# Patient Record
Sex: FEMALE | Race: WHITE
[De-identification: names, ages, dates, MRNs, and addresses within clinical notes are randomized per-mention and may not be internally consistent; named-entity substitution may affect disease eponyms.]

---

## 2021-03-06 ENCOUNTER — HOSPITAL ENCOUNTER (INPATIENT)
Dept: HOSPITAL 54 - ER | Age: 86
LOS: 1 days | Discharge: HOME | DRG: 282 | End: 2021-03-07
Attending: NURSE PRACTITIONER | Admitting: NURSE PRACTITIONER
Payer: COMMERCIAL

## 2021-03-06 VITALS — SYSTOLIC BLOOD PRESSURE: 137 MMHG | DIASTOLIC BLOOD PRESSURE: 69 MMHG

## 2021-03-06 VITALS — BODY MASS INDEX: 31.53 KG/M2 | WEIGHT: 167 LBS | HEIGHT: 61 IN

## 2021-03-06 VITALS — SYSTOLIC BLOOD PRESSURE: 147 MMHG | DIASTOLIC BLOOD PRESSURE: 79 MMHG

## 2021-03-06 VITALS — SYSTOLIC BLOOD PRESSURE: 161 MMHG | DIASTOLIC BLOOD PRESSURE: 83 MMHG

## 2021-03-06 VITALS — DIASTOLIC BLOOD PRESSURE: 76 MMHG | SYSTOLIC BLOOD PRESSURE: 125 MMHG

## 2021-03-06 DIAGNOSIS — E11.9: ICD-10-CM

## 2021-03-06 DIAGNOSIS — I25.2: ICD-10-CM

## 2021-03-06 DIAGNOSIS — I10: ICD-10-CM

## 2021-03-06 DIAGNOSIS — E78.5: ICD-10-CM

## 2021-03-06 DIAGNOSIS — Z95.1: ICD-10-CM

## 2021-03-06 DIAGNOSIS — Z95.0: ICD-10-CM

## 2021-03-06 DIAGNOSIS — E66.9: ICD-10-CM

## 2021-03-06 DIAGNOSIS — I25.10: ICD-10-CM

## 2021-03-06 DIAGNOSIS — I21.4: Primary | ICD-10-CM

## 2021-03-06 DIAGNOSIS — I16.0: ICD-10-CM

## 2021-03-06 DIAGNOSIS — Z20.822: ICD-10-CM

## 2021-03-06 LAB
ALBUMIN SERPL BCP-MCNC: 3.9 G/DL (ref 3.4–5)
ALP SERPL-CCNC: 122 U/L (ref 46–116)
ALT SERPL W P-5'-P-CCNC: 15 U/L (ref 12–78)
AST SERPL W P-5'-P-CCNC: 16 U/L (ref 15–37)
BASOPHILS # BLD AUTO: 0.3 /CMM (ref 0–0.2)
BASOPHILS NFR BLD AUTO: 2.8 % (ref 0–2)
BILIRUB DIRECT SERPL-MCNC: 0.1 MG/DL (ref 0–0.2)
BILIRUB SERPL-MCNC: 0.5 MG/DL (ref 0.2–1)
BUN SERPL-MCNC: 20 MG/DL (ref 7–18)
CALCIUM SERPL-MCNC: 9.2 MG/DL (ref 8.5–10.1)
CHLORIDE SERPL-SCNC: 100 MMOL/L (ref 98–107)
CO2 SERPL-SCNC: 28 MMOL/L (ref 21–32)
CREAT SERPL-MCNC: 0.9 MG/DL (ref 0.6–1.3)
EOSINOPHIL NFR BLD AUTO: 1.6 % (ref 0–6)
GLUCOSE SERPL-MCNC: 151 MG/DL (ref 74–106)
HCT VFR BLD AUTO: 43 % (ref 33–45)
HGB BLD-MCNC: 14.3 G/DL (ref 11.5–14.8)
LYMPHOCYTES NFR BLD AUTO: 1.4 /CMM (ref 0.8–4.8)
LYMPHOCYTES NFR BLD AUTO: 15.4 % (ref 20–44)
MCHC RBC AUTO-ENTMCNC: 34 G/DL (ref 31–36)
MCV RBC AUTO: 82 FL (ref 82–100)
MONOCYTES NFR BLD AUTO: 0.6 /CMM (ref 0.1–1.3)
MONOCYTES NFR BLD AUTO: 6.4 % (ref 2–12)
NEUTROPHILS # BLD AUTO: 6.8 /CMM (ref 1.8–8.9)
NEUTROPHILS NFR BLD AUTO: 73.8 % (ref 43–81)
NT-PROBNP SERPL-MCNC: 473 PG/ML (ref 0–125)
PLATELET # BLD AUTO: 204 /CMM (ref 150–450)
POTASSIUM SERPL-SCNC: 3.6 MMOL/L (ref 3.5–5.1)
PROT SERPL-MCNC: 8.1 G/DL (ref 6.4–8.2)
RBC # BLD AUTO: 5.18 MIL/UL (ref 4–5.2)
SODIUM SERPL-SCNC: 138 MMOL/L (ref 136–145)
WBC NRBC COR # BLD AUTO: 9.2 K/UL (ref 4.3–11)

## 2021-03-06 PROCEDURE — C9803 HOPD COVID-19 SPEC COLLECT: HCPCS

## 2021-03-06 PROCEDURE — G0378 HOSPITAL OBSERVATION PER HR: HCPCS

## 2021-03-06 RX ADMIN — METOPROLOL TARTRATE SCH MG: 50 TABLET, FILM COATED ORAL at 23:13

## 2021-03-06 RX ADMIN — METOPROLOL TARTRATE SCH MG: 50 TABLET, FILM COATED ORAL at 17:52

## 2021-03-06 RX ADMIN — LOSARTAN POTASSIUM AND HYDROCHLOROTHIAZIDE SCH EA: 50; 12.5 TABLET, FILM COATED ORAL at 09:13

## 2021-03-06 RX ADMIN — METOPROLOL TARTRATE SCH MG: 50 TABLET, FILM COATED ORAL at 12:13

## 2021-03-06 RX ADMIN — ENOXAPARIN SODIUM SCH MG: 80 INJECTION SUBCUTANEOUS at 20:07

## 2021-03-06 RX ADMIN — ASPIRIN 81 MG SCH MG: 81 TABLET ORAL at 09:13

## 2021-03-06 RX ADMIN — ENOXAPARIN SODIUM SCH MG: 80 INJECTION SUBCUTANEOUS at 20:10

## 2021-03-06 NOTE — NUR
TELE RN OPENING NOTES

PATIENT RESTING IN BED A/OX4; Macedonian SPEAKING; ABLE TO MAKE NEEDS KNOWN. ON ROOM AIR AND 
TOLERATING WELL WITH NO SOB. EXTERNAL CARDIAC MONITOR READS NSR AND HR AT 70'S. IV LAC #18 
S/L; PATENT AND INTACT. PATIENT DENIES PAIN AT THIS TIME. ALL SAFETY MEASURES IN PLACE: BED 
IN LOWEST LOCKED POSITION, CALL LIGHT WITHIN EASY REACH, BED ALARMS ON, SIDERAILS UPX2. 
PATIENT MEDICALLY STABLE AT THIS TIME.

## 2021-03-06 NOTE — NUR
TELE/RN OPENING NOTE



RECEIVED PATIENT RESTING IN BED. ALERT AND ORIENTED X 3. PRIMARILY Yakut SPEAKING. NO 
COMPLAINTS OF PAIN AT THIS TIME. CONTINUES ON ROOM AIR WITH NO SIGNS OR SYMPTOMS OF 
RESPIRATORY DISTRESS NOTED. IV ACCESS TO LEFT AC #18 INTACT AND PATENT. NO COMPLAINTS OF SOB 
OR CHEST PAIN NOTED. CALL LIGHT WITHIN REACH. ASPIRATION, FALL AND SAFETY PRECAUTIONS 
MAINTAINED. WILL CONTINUE TO MONITOR.

## 2021-03-06 NOTE — NUR
PT BIBRA FROM HOME C/O LEFT SIDE CHEST PAIN RADIATING TO L UPPER EXT AND L JAW 
PTA. NOTED HYPERTENSION, MD AWARE. PT ALSO C/O WEAKNESS. PT AWAKE, Ivorian 
SPEAKING. RESPIRATIONS EVEN AND UNLABORED. NO ACUTE DISTRESS NOTED AT THIS 
TIME. PLACED IN GOWN AND ON CONTINUOUS CARDIAC MONITOR AND PULSE OX, WILL 
CONTINUE TO MONITOR

## 2021-03-06 NOTE — NUR
TELE RN CLOSING NOTES



PATIENT RESTING IN BED, AWAKE AND VERBALLY RESPONSIVE. A/OX4, Telugu SPEAKING, ABLE TO MAKE 
NEEDS KNOWN. BREATHING EVEN AND UNLABORED, CONTINUES ON ROOM AIR, NO SOB NOTED. ON 
TELE-MONITORING READING OF SR, V-PACING, AT MID 70'S. IV LINE ON LAC #18 PATENT AND INTACT. 
ABLE TO AMBULATE TO BATHROOM W/ ASSIST. SAFETY MEASURES MAINTAINED. WILL ENDORSE TO NIGHT 
SHIFT RN FOR EDWINA.

## 2021-03-06 NOTE — NUR
CTA RN NOTES

PT STABLE S/P CTA , NO REACTION NOTED , VSS , DENIES SOB ,CHEST PAIN AND DISCOMFORT , PIV 
PATENT AND INTACT V PACING 70 ON THE MONITOR  , AFEBRILE , WILL CONTINUE TO MONITOR .

## 2021-03-06 NOTE — NUR
RN NOTES



PATIENT SEEN BY DR. FARAH W/ ORDER FOR CT ANGIO PROCEDURE; Kenyan-SPEAKING STAFF UTILIZED 
FOR TRANSLATION. CONSENT FORMS SIGNED BY PATIENT. PER RADIOLOGY, WILL TAKE PATIENT AROUND 
NOON/AFTER LUNCH.

## 2021-03-06 NOTE — NUR
TELE RN CLOSING NOTES



PATIENT RESTING IN BED A/OX4; Swedish SPEAKING; ABLE TO MAKE NEEDS KNOWN. ON ROOM AIR AND 
TOLERATING WELL WITH NO SOB. EXTERNAL CARDIAC MONITOR READS NSR AND HR AT 70'S. IV LAC #18 
S/L; PATENT AND INTACT. PATIENT DENIES PAIN AT THIS TIME. ALL SAFETY MEASURES IN PLACE: BED 
IN LOWEST LOCKED POSITION, CALL LIGHT WITHIN EASY REACH, BED ALARMS ON, SIDERAILS UPX2. 
PATIENT MEDICALLY STABLE AT THIS TIME. RECONCILED HOME MEDICATIONS WITH PATIENT'S DAUGHTER. 
WILL ENDORSE EDWINA TO ONCOMING MORNING RN.

## 2021-03-06 NOTE — NUR
TELE/RN NOTE



PATIENT RECEIVED DOSE OF ENOXAPARIN @ 2000. MD ORDER IN PLACE IS FOR q12 ENOXAPARIN.

## 2021-03-06 NOTE — NUR
RN NOTES



PATIENT RETURNED FROM CT ANGIO PROCEDURE VIA WHEELCHAIR, ACCOMPANIED BY 2 RADIOLOGY TECHS. 
PATIENT SITTING UP AT EDGE OF BED EATING LATE LUNCH. INFORMED BY RADIOLOGY TECH TO HOLD 
METFORMIN X48 HRS IF PATIENT IS ON METFORMIN.

## 2021-03-06 NOTE — NUR
RN NOTES



PATIENT WAS TAKEN FOR CT ANGIO PROCEDURE VIA WHEELCHAIR, ACCOMPANIED BY 2 RADIOLOGY TECHS.

## 2021-03-06 NOTE — NUR
TELE RN ADMISSION NOTES



RECEIVED REPORT FROM SARA NICE. PATIENT TRANSFERRED FROM ER TO TELE; PATIENT ABLE TO 
AMBULATE FROM [facility] TO BED. PATIENT A/OX4; Lithuanian SPEAKING; ABLE TO MAKE NEEDS KNOWN. ON 
ROOM AIR AND TOLERATING WELL WITH NO SOB. EXTERNAL CARDIAC MONITOR READS NSR AND HR AT 70'S. 
IV LAC #18 S/L; PATENT AND INTACT. PATIENT DENIES PAIN AT THIS TIME. ALL BELONGINGS 
ACCOUNTED FOR AND RN SIGNED PATIENT BELONGINGS CHECKLIST. RN ORIENTED PATIENT TO ROOM, UNIT, 
AND STAFF. ALL SAFETY MEASURES IN PLACE: BED IN LOWEST LOCKED POSITION, CALL LIGHT WITHIN 
EASY REACH, BED ALARMS ON, SIDERAILS UPX2. PATIENT MEDICALLY STABLE AT THIS TIME.

## 2021-03-07 VITALS — DIASTOLIC BLOOD PRESSURE: 77 MMHG | SYSTOLIC BLOOD PRESSURE: 116 MMHG

## 2021-03-07 VITALS — SYSTOLIC BLOOD PRESSURE: 143 MMHG | DIASTOLIC BLOOD PRESSURE: 74 MMHG

## 2021-03-07 VITALS — SYSTOLIC BLOOD PRESSURE: 135 MMHG | DIASTOLIC BLOOD PRESSURE: 68 MMHG

## 2021-03-07 VITALS — DIASTOLIC BLOOD PRESSURE: 72 MMHG | SYSTOLIC BLOOD PRESSURE: 175 MMHG

## 2021-03-07 LAB
BASOPHILS # BLD AUTO: 0 /CMM (ref 0–0.2)
BASOPHILS NFR BLD AUTO: 0.8 % (ref 0–2)
BUN SERPL-MCNC: 20 MG/DL (ref 7–18)
CALCIUM SERPL-MCNC: 9.3 MG/DL (ref 8.5–10.1)
CHLORIDE SERPL-SCNC: 103 MMOL/L (ref 98–107)
CHOLEST SERPL-MCNC: 137 MG/DL (ref ?–200)
CO2 SERPL-SCNC: 28 MMOL/L (ref 21–32)
CREAT SERPL-MCNC: 1 MG/DL (ref 0.6–1.3)
EOSINOPHIL NFR BLD AUTO: 2.2 % (ref 0–6)
GLUCOSE SERPL-MCNC: 137 MG/DL (ref 74–106)
HCT VFR BLD AUTO: 39 % (ref 33–45)
HDLC SERPL-MCNC: 44 MG/DL (ref 40–60)
HGB BLD-MCNC: 13.2 G/DL (ref 11.5–14.8)
LDLC SERPL DIRECT ASSAY-MCNC: 75 MG/DL (ref 0–99)
LYMPHOCYTES NFR BLD AUTO: 1.4 /CMM (ref 0.8–4.8)
LYMPHOCYTES NFR BLD AUTO: 25.3 % (ref 20–44)
MAGNESIUM SERPL-MCNC: 2.2 MG/DL (ref 1.8–2.4)
MCHC RBC AUTO-ENTMCNC: 34 G/DL (ref 31–36)
MCV RBC AUTO: 83 FL (ref 82–100)
MONOCYTES NFR BLD AUTO: 0.5 /CMM (ref 0.1–1.3)
MONOCYTES NFR BLD AUTO: 9.4 % (ref 2–12)
NEUTROPHILS # BLD AUTO: 3.5 /CMM (ref 1.8–8.9)
NEUTROPHILS NFR BLD AUTO: 62.3 % (ref 43–81)
NT-PROBNP SERPL-MCNC: 675 PG/ML (ref 0–125)
PHOSPHATE SERPL-MCNC: 3.5 MG/DL (ref 2.5–4.9)
PLATELET # BLD AUTO: 159 /CMM (ref 150–450)
POTASSIUM SERPL-SCNC: 3.8 MMOL/L (ref 3.5–5.1)
RBC # BLD AUTO: 4.74 MIL/UL (ref 4–5.2)
SODIUM SERPL-SCNC: 139 MMOL/L (ref 136–145)
TRIGL SERPL-MCNC: 106 MG/DL (ref 30–150)
TSH SERPL DL<=0.005 MIU/L-ACNC: 2.91 UIU/ML (ref 0.36–3.74)
WBC NRBC COR # BLD AUTO: 5.6 K/UL (ref 4.3–11)

## 2021-03-07 RX ADMIN — LOSARTAN POTASSIUM AND HYDROCHLOROTHIAZIDE SCH EA: 50; 12.5 TABLET, FILM COATED ORAL at 08:17

## 2021-03-07 RX ADMIN — ASPIRIN 81 MG SCH MG: 81 TABLET ORAL at 08:18

## 2021-03-07 RX ADMIN — METOPROLOL TARTRATE SCH MG: 50 TABLET, FILM COATED ORAL at 05:52

## 2021-03-07 RX ADMIN — ENOXAPARIN SODIUM SCH MG: 80 INJECTION SUBCUTANEOUS at 08:20

## 2021-03-07 RX ADMIN — METOPROLOL TARTRATE SCH MG: 50 TABLET, FILM COATED ORAL at 11:51

## 2021-03-07 NOTE — NUR
TELE RN OPENING NOTES



PATIENT RESTING IN BED, AWAKE AND VERBALLY RESPONSIVE. A/OX4, Greek SPEAKING, ABLE TO MAKE 
NEEDS KNOWN. BREATHING EVEN AND UNLABORED, ON ROOM AIR, NO RESPIRATORY DISTRESS. NO 
COMPLAINT OF PAIN AT THIS TIME. ON TELE-MONITORING READING OF SR, V-PACING, AT 70'S. IV LINE 
ON LAC #18 PATENT AND INTACT. SAFETY MEASURES IN PLACE. WILL CONTINUE TO MONITOR.

## 2021-03-07 NOTE — NUR
TELE/RN CLOSING NOTE



PATIENT CURRENTLY RESTING IN BED. ALERT AND ORIENTED X 3. Macedonian SPEAKING. NO COMPLAINTS OF 
PAIN THIS SHIFT. IV ACCESS TO LEFT AC #18G INTACT AND PATENT. EKG DONE THIS AM AND IN CHART. 
CALL LIGHT WITHIN REACH. ASPIRATION, FALL AND SAFETY PRECAUTIONS MAINTAINED. WILL ENDORSE 
PLAN OF CARE TO ONCOMING SHIFT.

## 2021-03-07 NOTE — NUR
RN DISCHARGE NOTES



PATIENT SEEN BY DR. GILBERT W/ ORDER FOR DISCHARGE TO HOME; CLEARED BY CARDIOLOGY. PATIENT IS 
A/O X3, VERBALLY RESPONSIVE, ABLE TO MAKE NEEDS KNOWN AND UNDERSTAND CONCEPTS. DISCHARGE 
INSTRUCTIONS AND EDUCATION PROVIDED TO PATIENT AND FAMILY VIA Jordanian-SPEAKING STAFF. 
DISCHARGE FORM AND BELONGINGS LIST FORM SIGNED BY PATIENT; ALL BELONGINGS ACCOUNTED FOR. IV 
LINE AND NAME ARMBAND REMOVED. NO SKIN ISSUES NOTED. PATIENT WAS ACCOMPANIED TO THE LOBBY BY 
[facility], VIA WHEELCHAIR AND PICKED UP BY [facility] VIA PRIVATE CAR. CHARGE NURSE AND MD 
AWARE OF DISCHARGE.